# Patient Record
Sex: MALE | Race: WHITE | NOT HISPANIC OR LATINO | Employment: FULL TIME | URBAN - METROPOLITAN AREA
[De-identification: names, ages, dates, MRNs, and addresses within clinical notes are randomized per-mention and may not be internally consistent; named-entity substitution may affect disease eponyms.]

---

## 2019-10-22 ENCOUNTER — OFFICE VISIT CONVERTED (OUTPATIENT)
Dept: ORTHOPEDIC SURGERY | Facility: CLINIC | Age: 34
End: 2019-10-22
Attending: ORTHOPAEDIC SURGERY

## 2019-11-07 ENCOUNTER — OFFICE VISIT CONVERTED (OUTPATIENT)
Dept: ORTHOPEDIC SURGERY | Facility: CLINIC | Age: 34
End: 2019-11-07
Attending: ORTHOPAEDIC SURGERY

## 2020-12-15 ENCOUNTER — HOSPITAL ENCOUNTER (OUTPATIENT)
Dept: MRI IMAGING | Facility: HOSPITAL | Age: 35
Discharge: HOME | End: 2020-12-15
Attending: NURSE PRACTITIONER

## 2021-05-15 VITALS — WEIGHT: 198 LBS | BODY MASS INDEX: 31.08 KG/M2 | HEIGHT: 67 IN | HEART RATE: 79 BPM | OXYGEN SATURATION: 97 %

## 2021-05-15 VITALS — WEIGHT: 198 LBS | HEIGHT: 67 IN | HEART RATE: 87 BPM | BODY MASS INDEX: 31.08 KG/M2 | OXYGEN SATURATION: 97 %

## 2021-06-22 ENCOUNTER — APPOINTMENT (OUTPATIENT)
Dept: GENERAL RADIOLOGY | Facility: HOSPITAL | Age: 36
End: 2021-06-22

## 2021-06-22 ENCOUNTER — HOSPITAL ENCOUNTER (EMERGENCY)
Facility: HOSPITAL | Age: 36
Discharge: HOME | End: 2021-06-22
Attending: EMERGENCY MEDICINE | Admitting: EMERGENCY MEDICINE

## 2021-06-22 VITALS
SYSTOLIC BLOOD PRESSURE: 134 MMHG | HEART RATE: 94 BPM | OXYGEN SATURATION: 97 % | RESPIRATION RATE: 16 BRPM | HEIGHT: 67 IN | BODY MASS INDEX: 32.56 KG/M2 | TEMPERATURE: 98.5 F | DIASTOLIC BLOOD PRESSURE: 107 MMHG | WEIGHT: 207.45 LBS

## 2021-06-22 DIAGNOSIS — M19.90 ARTHRITIS: Primary | ICD-10-CM

## 2021-06-22 LAB
ALBUMIN SERPL-MCNC: 4.9 G/DL (ref 3.5–5.2)
ALBUMIN/GLOB SERPL: 1.9 G/DL
ALP SERPL-CCNC: 95 U/L (ref 39–117)
ALT SERPL W P-5'-P-CCNC: 39 U/L (ref 1–41)
ANION GAP SERPL CALCULATED.3IONS-SCNC: 11.8 MMOL/L (ref 5–15)
AST SERPL-CCNC: 29 U/L (ref 1–40)
BASOPHILS # BLD AUTO: 0.02 10*3/MM3 (ref 0–0.2)
BASOPHILS NFR BLD AUTO: 0.3 % (ref 0–1.5)
BILIRUB SERPL-MCNC: 0.6 MG/DL (ref 0–1.2)
BUN SERPL-MCNC: 11 MG/DL (ref 6–20)
BUN/CREAT SERPL: 8.5 (ref 7–25)
CALCIUM SPEC-SCNC: 9.3 MG/DL (ref 8.6–10.5)
CHLORIDE SERPL-SCNC: 104 MMOL/L (ref 98–107)
CO2 SERPL-SCNC: 23.2 MMOL/L (ref 22–29)
CREAT SERPL-MCNC: 1.29 MG/DL (ref 0.76–1.27)
DEPRECATED RDW RBC AUTO: 37.9 FL (ref 37–54)
EOSINOPHIL # BLD AUTO: 0.04 10*3/MM3 (ref 0–0.4)
EOSINOPHIL NFR BLD AUTO: 0.5 % (ref 0.3–6.2)
ERYTHROCYTE [DISTWIDTH] IN BLOOD BY AUTOMATED COUNT: 12.1 % (ref 12.3–15.4)
GFR SERPL CREATININE-BSD FRML MDRD: 62 ML/MIN/1.73
GLOBULIN UR ELPH-MCNC: 2.6 GM/DL
GLUCOSE SERPL-MCNC: 82 MG/DL (ref 65–99)
HCT VFR BLD AUTO: 44.9 % (ref 37.5–51)
HGB BLD-MCNC: 15.7 G/DL (ref 13–17.7)
IMM GRANULOCYTES # BLD AUTO: 0.02 10*3/MM3 (ref 0–0.05)
IMM GRANULOCYTES NFR BLD AUTO: 0.3 % (ref 0–0.5)
LYMPHOCYTES # BLD AUTO: 1.91 10*3/MM3 (ref 0.7–3.1)
LYMPHOCYTES NFR BLD AUTO: 25.6 % (ref 19.6–45.3)
MCH RBC QN AUTO: 30.4 PG (ref 26.6–33)
MCHC RBC AUTO-ENTMCNC: 35 G/DL (ref 31.5–35.7)
MCV RBC AUTO: 86.8 FL (ref 79–97)
MONOCYTES # BLD AUTO: 0.44 10*3/MM3 (ref 0.1–0.9)
MONOCYTES NFR BLD AUTO: 5.9 % (ref 5–12)
NEUTROPHILS NFR BLD AUTO: 5.04 10*3/MM3 (ref 1.7–7)
NEUTROPHILS NFR BLD AUTO: 67.4 % (ref 42.7–76)
NRBC BLD AUTO-RTO: 0 /100 WBC (ref 0–0.2)
PLATELET # BLD AUTO: 161 10*3/MM3 (ref 140–450)
PMV BLD AUTO: 10.3 FL (ref 6–12)
POTASSIUM SERPL-SCNC: 3.9 MMOL/L (ref 3.5–5.2)
PROT SERPL-MCNC: 7.5 G/DL (ref 6–8.5)
RBC # BLD AUTO: 5.17 10*6/MM3 (ref 4.14–5.8)
SODIUM SERPL-SCNC: 139 MMOL/L (ref 136–145)
WBC # BLD AUTO: 7.47 10*3/MM3 (ref 3.4–10.8)

## 2021-06-22 PROCEDURE — 25010000002 DEXAMETHASONE PER 1 MG: Performed by: NURSE PRACTITIONER

## 2021-06-22 PROCEDURE — 85025 COMPLETE CBC W/AUTO DIFF WBC: CPT | Performed by: EMERGENCY MEDICINE

## 2021-06-22 PROCEDURE — 36415 COLL VENOUS BLD VENIPUNCTURE: CPT | Performed by: EMERGENCY MEDICINE

## 2021-06-22 PROCEDURE — 73630 X-RAY EXAM OF FOOT: CPT

## 2021-06-22 PROCEDURE — 96372 THER/PROPH/DIAG INJ SC/IM: CPT

## 2021-06-22 PROCEDURE — 25010000002 KETOROLAC TROMETHAMINE PER 15 MG: Performed by: NURSE PRACTITIONER

## 2021-06-22 PROCEDURE — 99283 EMERGENCY DEPT VISIT LOW MDM: CPT

## 2021-06-22 PROCEDURE — 80053 COMPREHEN METABOLIC PANEL: CPT | Performed by: EMERGENCY MEDICINE

## 2021-06-22 RX ORDER — BUPROPION HYDROCHLORIDE 150 MG/1
150 TABLET ORAL DAILY
COMMUNITY

## 2021-06-22 RX ORDER — DEXAMETHASONE SODIUM PHOSPHATE 4 MG/ML
4 INJECTION, SOLUTION INTRA-ARTICULAR; INTRALESIONAL; INTRAMUSCULAR; INTRAVENOUS; SOFT TISSUE ONCE
Status: COMPLETED | OUTPATIENT
Start: 2021-06-22 | End: 2021-06-22

## 2021-06-22 RX ORDER — METHYLPHENIDATE HYDROCHLORIDE 18 MG/1
18 TABLET ORAL DAILY
COMMUNITY

## 2021-06-22 RX ORDER — CLONIDINE HYDROCHLORIDE 0.1 MG/1
0.1 TABLET ORAL NIGHTLY
COMMUNITY

## 2021-06-22 RX ORDER — KETOROLAC TROMETHAMINE 30 MG/ML
60 INJECTION, SOLUTION INTRAMUSCULAR; INTRAVENOUS ONCE
Status: COMPLETED | OUTPATIENT
Start: 2021-06-22 | End: 2021-06-22

## 2021-06-22 RX ADMIN — KETOROLAC TROMETHAMINE 60 MG: 60 INJECTION, SOLUTION INTRAMUSCULAR at 21:01

## 2021-06-22 RX ADMIN — DEXAMETHASONE SODIUM PHOSPHATE 4 MG: 4 INJECTION, SOLUTION INTRA-ARTICULAR; INTRALESIONAL; INTRAMUSCULAR; INTRAVENOUS; SOFT TISSUE at 21:01

## 2021-06-22 NOTE — ED PROVIDER NOTES
Subjective   Patient reports that he was on vacation for 20 days and while on vacation he started to have bilateral foot pain.  He reports that when he returned to work his pain worsened a couple days ago.  He is in the  and wears lace up boots.  He reports that he was swollen and red where the boots were earlier today.  He reports that he showed his boss who advised him to come to the emergency department in case he has cellulitis.      History provided by:  Patient      Review of Systems   Musculoskeletal: Positive for joint swelling (Pain bilateral great toes.).   Neurological: Negative for weakness and numbness.   All other systems reviewed and are negative.      Past Medical History:   Diagnosis Date   • Anxiety    • Pain aggravated by physical activity    • PTSD (post-traumatic stress disorder)        No Known Allergies    Past Surgical History:   Procedure Laterality Date   • APPENDECTOMY     • NOSE SURGERY     • TONSILLECTOMY     • VASECTOMY         History reviewed. No pertinent family history.    Social History     Socioeconomic History   • Marital status:      Spouse name: Not on file   • Number of children: Not on file   • Years of education: Not on file   • Highest education level: Not on file   Tobacco Use   • Smoking status: Never Smoker   • Smokeless tobacco: Never Used   Vaping Use   • Vaping Use: Never used   Substance and Sexual Activity   • Alcohol use: Not Currently   • Drug use: Never           Objective   Physical Exam  Vitals and nursing note reviewed.   Constitutional:       General: He is not in acute distress.     Appearance: Normal appearance. He is not ill-appearing.   HENT:      Head: Normocephalic and atraumatic.   Eyes:      Pupils: Pupils are equal, round, and reactive to light.   Cardiovascular:      Rate and Rhythm: Normal rate and regular rhythm.   Pulmonary:      Effort: Pulmonary effort is normal.      Breath sounds: Normal breath sounds.   Abdominal:       General: Abdomen is flat. Bowel sounds are normal.      Palpations: Abdomen is soft.   Musculoskeletal:         General: Tenderness (Bilateral great toes) present. No swelling, deformity or signs of injury. Normal range of motion.      Cervical back: Normal range of motion and neck supple.      Right lower leg: No edema.      Left lower leg: No edema.   Skin:     General: Skin is warm and dry.   Neurological:      General: No focal deficit present.      Mental Status: He is alert and oriented to person, place, and time.   Psychiatric:         Mood and Affect: Mood normal.         Behavior: Behavior normal.         Thought Content: Thought content normal.         Procedures           ED Course                                           MDM  Number of Diagnoses or Management Options     Amount and/or Complexity of Data Reviewed  Clinical lab tests: reviewed  Tests in the radiology section of CPT®: reviewed    Patient Progress  Patient progress: stable      Final diagnoses:   Arthritis       ED Disposition  ED Disposition     ED Disposition Condition Comment    Discharge Stable           Smitha Mulligan  [address] [place] [address]  [phone]      As needed         Medication List      No changes were made to your prescriptions during this visit.          Anahy Perkins, APRN  06/23/21 0145

## 2021-06-22 NOTE — ED TRIAGE NOTES
Pt arrived via PV from home with c/o bilateral feet swelling, redness and pain that started a couple days ago and stays constant. Pt denies any fevers.

## 2021-06-23 NOTE — DISCHARGE INSTRUCTIONS
As discussed, your symptoms are likely related to development of folliculitis.  Please take over-the-counter ibuprofen to decrease inflammation and follow-up with your primary care provider if symptoms persist.

## 2022-05-31 ENCOUNTER — OFFICE VISIT (OUTPATIENT)
Dept: NEUROSURGERY | Facility: CLINIC | Age: 37
End: 2022-05-31

## 2022-05-31 VITALS
HEIGHT: 67 IN | DIASTOLIC BLOOD PRESSURE: 107 MMHG | SYSTOLIC BLOOD PRESSURE: 136 MMHG | BODY MASS INDEX: 31.42 KG/M2 | WEIGHT: 200.2 LBS

## 2022-05-31 DIAGNOSIS — M47.812 CERVICAL SPONDYLOSIS WITHOUT MYELOPATHY: Primary | ICD-10-CM

## 2022-05-31 PROCEDURE — 99203 OFFICE O/P NEW LOW 30 MIN: CPT | Performed by: NEUROLOGICAL SURGERY

## 2022-05-31 NOTE — PROGRESS NOTES
"Chief Complaint  Neck Pain    Subjective          Jordy Mg who is a 37 y.o. year old male who presents to [facility] for Evaluation of the Spine.     The patient complains of pain located in the cervical spine.  Patients states the pain has been present for 10 years.  The pain came on gradually.  The pain scale level is 5-8/10 in severity.  The pain can radiate into the bilateral arms, but the neck pain is worse than the arm pain.  The pain is waxing/waning and described as dull and pressure like.  The pain is worse in the evening. Patient states nothing worsens the pain. Patient states ice  and massage/dry needling makes the pain better.    Associated Symptoms Include: Numbness and Tingling into the small fingers mostly at night  Conservative Interventions Include: Gabapentin-feels a little off, flexeril-no help, but makes sleepy, he has had some injections and ablations with PT with temporary relief    Was this the result of an injury or accident?: No, but significant wear and tear as  medic    History of Previous Spinal Surgery?: No    This patient  reports that he has never smoked. He has never used smokeless tobacco.    Review of Systems   Musculoskeletal: Positive for back pain, myalgias and neck pain.   Neurological: Positive for numbness and headache.        Objective   Vital Signs:   BP (!) 136/107 (BP Location: Right arm, Patient Position: Sitting)   Ht 170.2 cm (67\")   Wt 90.8 kg (200 lb 3.2 oz)   BMI 31.36 kg/m²       Physical Exam  Constitutional:       Appearance: He is normal weight.   Neck:      Comments: Increased pain turning left and with extension  Musculoskeletal:         General: Tenderness (mild TTP in the cervical region) present.      Cervical back: Normal range of motion.   Neurological:      Mental Status: He is alert.      Sensory: No sensory deficit.      Motor: No weakness.      Deep Tendon Reflexes: Reflexes normal (neg " Pito).   Psychiatric:         Mood and Affect: Mood normal.          Result Review :   I personally reviewed the patient's MRI scan which shows C3-4, C4-5, C5-6 and C6-7 with no significant spinal stenosis.      Assessment and Plan    Diagnoses and all orders for this visit:    1. Cervical spondylosis without myelopathy (Primary)    He will attempt to avoid surgery for as long as possible. He will monitor for worsened arm pain. His current intermittent arm numbness (small finger) could be ulnar related, but not likely from the neck.    He will work on cervical strengthening and stay away from strenuous activity.     Follow Up   No follow-ups on file.  Patient was given instructions and counseling regarding his condition or for health maintenance advice. Please see specific information pulled into the AVS if appropriate.

## 2023-07-21 ENCOUNTER — APPOINTMENT (OUTPATIENT)
Dept: GENERAL RADIOLOGY | Facility: HOSPITAL | Age: 38
End: 2023-07-21
Payer: OTHER GOVERNMENT

## 2023-07-21 ENCOUNTER — APPOINTMENT (OUTPATIENT)
Dept: MRI IMAGING | Facility: HOSPITAL | Age: 38
End: 2023-07-21
Payer: OTHER GOVERNMENT

## 2023-07-21 ENCOUNTER — HOSPITAL ENCOUNTER (EMERGENCY)
Facility: HOSPITAL | Age: 38
Discharge: HOME | End: 2023-07-21
Attending: EMERGENCY MEDICINE | Admitting: EMERGENCY MEDICINE
Payer: OTHER GOVERNMENT

## 2023-07-21 VITALS
RESPIRATION RATE: 22 BRPM | OXYGEN SATURATION: 100 % | HEART RATE: 114 BPM | SYSTOLIC BLOOD PRESSURE: 119 MMHG | DIASTOLIC BLOOD PRESSURE: 98 MMHG | TEMPERATURE: 98.7 F | BODY MASS INDEX: 29.58 KG/M2 | HEIGHT: 67 IN | WEIGHT: 188.49 LBS

## 2023-07-21 DIAGNOSIS — M51.36 ANNULAR TEAR OF LUMBAR DISC: ICD-10-CM

## 2023-07-21 DIAGNOSIS — S39.012A STRAIN OF LUMBAR REGION, INITIAL ENCOUNTER: Primary | ICD-10-CM

## 2023-07-21 PROCEDURE — 72148 MRI LUMBAR SPINE W/O DYE: CPT

## 2023-07-21 PROCEDURE — 72100 X-RAY EXAM L-S SPINE 2/3 VWS: CPT

## 2023-07-21 PROCEDURE — 96372 THER/PROPH/DIAG INJ SC/IM: CPT

## 2023-07-21 PROCEDURE — 63710000001 ONDANSETRON ODT 4 MG TABLET DISPERSIBLE

## 2023-07-21 PROCEDURE — 25010000002 KETOROLAC TROMETHAMINE PER 15 MG

## 2023-07-21 PROCEDURE — 99283 EMERGENCY DEPT VISIT LOW MDM: CPT

## 2023-07-21 RX ORDER — CYCLOBENZAPRINE HCL 10 MG
10 TABLET ORAL 3 TIMES DAILY PRN
Qty: 30 TABLET | Refills: 0 | Status: SHIPPED | OUTPATIENT
Start: 2023-07-21 | End: 2023-07-21 | Stop reason: SDUPTHER

## 2023-07-21 RX ORDER — KETOROLAC TROMETHAMINE 10 MG/1
10 TABLET, FILM COATED ORAL EVERY 6 HOURS PRN
Qty: 20 TABLET | Refills: 0 | Status: SHIPPED | OUTPATIENT
Start: 2023-07-21 | End: 2023-07-21 | Stop reason: SDUPTHER

## 2023-07-21 RX ORDER — ONDANSETRON 4 MG/1
4 TABLET, ORALLY DISINTEGRATING ORAL ONCE
Status: COMPLETED | OUTPATIENT
Start: 2023-07-21 | End: 2023-07-21

## 2023-07-21 RX ORDER — CYCLOBENZAPRINE HCL 10 MG
10 TABLET ORAL 3 TIMES DAILY PRN
Qty: 30 TABLET | Refills: 0 | Status: SHIPPED | OUTPATIENT
Start: 2023-07-21 | End: 2023-07-31

## 2023-07-21 RX ORDER — TRAMADOL HYDROCHLORIDE 50 MG/1
50 TABLET ORAL EVERY 6 HOURS PRN
Qty: 16 TABLET | Refills: 0 | Status: SHIPPED | OUTPATIENT
Start: 2023-07-21 | End: 2023-07-25

## 2023-07-21 RX ORDER — KETOROLAC TROMETHAMINE 30 MG/ML
30 INJECTION, SOLUTION INTRAMUSCULAR; INTRAVENOUS ONCE
Status: COMPLETED | OUTPATIENT
Start: 2023-07-21 | End: 2023-07-21

## 2023-07-21 RX ORDER — TRAMADOL HYDROCHLORIDE 50 MG/1
50 TABLET ORAL EVERY 6 HOURS PRN
Qty: 16 TABLET | Refills: 0 | Status: SHIPPED | OUTPATIENT
Start: 2023-07-21 | End: 2023-07-21 | Stop reason: SDUPTHER

## 2023-07-21 RX ORDER — KETOROLAC TROMETHAMINE 10 MG/1
10 TABLET, FILM COATED ORAL EVERY 6 HOURS PRN
Qty: 20 TABLET | Refills: 0 | Status: SHIPPED | OUTPATIENT
Start: 2023-07-21 | End: 2023-07-26

## 2023-07-21 RX ADMIN — ONDANSETRON 4 MG: 4 TABLET, ORALLY DISINTEGRATING ORAL at 15:40

## 2023-07-21 RX ADMIN — KETOROLAC TROMETHAMINE 30 MG: 30 INJECTION, SOLUTION INTRAMUSCULAR; INTRAVENOUS at 15:40

## 2023-07-21 NOTE — ED PROVIDER NOTES
Time: 3:12 PM EDT  Date of encounter:  7/21/2023  Independent Historian/Clinical History and Information was obtained by:   Patient    History is limited by: N/A    Chief Complaint: Back pain      History of Present Illness:  Patient is a 38 y.o. year old male who presents to the emergency department for evaluation of sudden onset back pain while he was pulling a tree out of his yard.  Patient states he felt something pop in his lower back and excruciating pain.  Patient denies any numbness and tingling denies loss of bladder or bowel function.    HPI    Patient Care Team  Primary Care Provider: Smitha Mulligan APRN    Past Medical History:     No Known Allergies  Past Medical History:   Diagnosis Date   • ADHD    • Anxiety    • Arthritis    • Brain concussion    • Cervical disc disorder    • Headache    • Hyperlipidemia    • Hypertension    • Low back pain    • Lumbosacral disc disease    • Migraines    • Pain aggravated by physical activity    • Peripheral neuropathy    • Plantar fasciitis    • PTSD (post-traumatic stress disorder)      Past Surgical History:   Procedure Laterality Date   • APPENDECTOMY     • EPIDURAL BLOCK      Neck   • NOSE SURGERY     • TONSILLECTOMY     • TRIGGER POINT INJECTION      Neck and shoulders   • VASECTOMY       Family History   Family history unknown: Yes       Home Medications:  Prior to Admission medications    Medication Sig Start Date End Date Taking? Authorizing Provider   buPROPion (WELLBUTRIN) 75 MG tablet bupropion HCl 75 mg tablet    Emergency, Nurse KENNETH Obrien   buPROPion XL (WELLBUTRIN XL) 150 MG 24 hr tablet Take 150 mg by mouth Daily.    ProviderFamilia MD   cloNIDine (CATAPRES) 0.1 MG tablet Take 0.1 mg by mouth Every Night.    ProviderFamilia MD   cyclobenzaprine (FLEXERIL) 10 MG tablet cyclobenzaprine 10 mg tablet    Emergency, Nurse Micah RN   Diclofenac Sodium (VOLTAREN) 1 % gel gel Apply 4 g topically to the appropriate area as directed 3 (Three) Times a Day  "As Needed.    Provider, MD Familia   escitalopram (LEXAPRO) 10 MG tablet escitalopram 10 mg tablet    Emergency, Nurse Micah RN   escitalopram (LEXAPRO) 20 MG tablet escitalopram 20 mg tablet    Emergency, Nurse Micah RN   gabapentin (NEURONTIN) 300 MG capsule gabapentin 300 mg capsule    Emergency, Nurse Micah, RN   methylphenidate 18 MG CR tablet Take 18 mg by mouth Daily    Provider, MD Familia   Methylphenidate HCl ER 54 MG tablet sustained-release 24 hour methylphenidate ER 54 mg tablet,extended release 24 hr    Emergency, Nurse Micah RN   propranolol (INDERAL) 10 MG tablet 40 mg Daily.    Emergency, Nurse Micah, RN   Testosterone 10 MG/ACT (2%) gel testosterone 10 mg/0.5 gram/actuation transdermal gel pump    Emergency, Nurse Micah RN        Social History:   Social History     Tobacco Use   • Smoking status: Never   • Smokeless tobacco: Never   Vaping Use   • Vaping Use: Never used   Substance Use Topics   • Alcohol use: Not Currently   • Drug use: Never         Review of Systems:  Review of Systems   Constitutional:  Negative for chills and fever.   Gastrointestinal:  Negative for abdominal pain, nausea and vomiting.        Negative for bowel incontinence   Genitourinary:  Negative for dysuria, flank pain and hematuria.        Negative for bladder incontinence   Musculoskeletal:  Positive for back pain.   Neurological:  Negative for weakness and numbness.        Negative for saddle anesthesia   All other systems reviewed and are negative.     Physical Exam:  /98   Pulse 114   Temp 98.7 °F (37.1 °C) (Oral)   Resp 22   Ht 170.2 cm (67\")   Wt 85.5 kg (188 lb 7.9 oz)   SpO2 100%   BMI 29.52 kg/m²     Physical Exam  Vitals and nursing note reviewed.   Constitutional:       General: He is not in acute distress.     Appearance: Normal appearance. He is not toxic-appearing.   HENT:      Head: Normocephalic and atraumatic.      Jaw: There is normal jaw occlusion.   Eyes:      General: Lids are " normal.      Extraocular Movements: Extraocular movements intact.      Conjunctiva/sclera: Conjunctivae normal.      Pupils: Pupils are equal, round, and reactive to light.   Cardiovascular:      Rate and Rhythm: Normal rate and regular rhythm.      Pulses: Normal pulses.      Heart sounds: Normal heart sounds.   Pulmonary:      Effort: Pulmonary effort is normal. No respiratory distress.      Breath sounds: Normal breath sounds. No wheezing or rhonchi.   Abdominal:      General: Abdomen is flat.      Palpations: Abdomen is soft.      Tenderness: There is no abdominal tenderness. There is no guarding or rebound.   Musculoskeletal:         General: Swelling present. Normal range of motion.      Cervical back: Normal range of motion and neck supple.      Lumbar back: Swelling, tenderness and bony tenderness present.      Right lower leg: No edema.      Left lower leg: No edema.   Skin:     General: Skin is warm and dry.   Neurological:      Mental Status: He is alert and oriented to person, place, and time. Mental status is at baseline.   Psychiatric:         Mood and Affect: Mood normal.                Procedures:  Procedures      Medical Decision Making:      Comorbidities that affect care:    None    External Notes reviewed:    Previous Clinic Note: Office visit with [facility] from 3/6/2023      The following orders were placed and all results were independently analyzed by me:  Orders Placed This Encounter   Procedures   • XR Spine Lumbar 2 or 3 View   • MRI Lumbar Spine Without Contrast   • General MD Inpatient Consult       Medications Given in the Emergency Department:  Medications   ondansetron ODT (ZOFRAN-ODT) disintegrating tablet 4 mg (4 mg Oral Given 7/21/23 1540)   ketorolac (TORADOL) injection 30 mg (30 mg Intramuscular Given 7/21/23 1540)        ED Course:    ED Course as of 07/21/23 1823   Fri Jul 21, 2023   1620 Went to discuss discharge with the patient when he reported feeling  numbness and tingling in his toes and lower legs.  Plan to order MRI before discharge. [RM]   1812 Spoke with Dr. Francisco with neurosurgery at [facility] regarding patient's MRI results, his recommendations are for the patient to follow-up with primary care for physical therapy referral, states the patient's pain should resolve in a couple of weeks, but if it continues after therapy he should follow-up with neurosurgery. [RM]      ED Course User Index  [RM] Yahaira Sorensen APRN       Labs:    Lab Results (last 24 hours)       ** No results found for the last 24 hours. **             Imaging:    XR Spine Lumbar 2 or 3 View    Result Date: 7/21/2023  PROCEDURE: XR SPINE LUMBAR 2 OR 3 VW  COMPARISON: None  INDICATIONS: GENERALIZED LOWER BACK PAIN AFTER INJURY TODAY  FINDINGS:  No fracture is identified.  Alignment and mineralization appear within normal limits.  There is mild marginal disc osteophyte formation at L3-4.  Disc heights appear preserved.  No significant facet arthropathy.  Soft tissues appear unremarkable.        1. No radiographic findings of acute osseous lumbar spine abnormality.  2. MRI may be considered for further evaluation as clinically indicated.     ROMY MOSER MD       Electronically Signed and Approved By: ROMY MOSER MD on 7/21/2023 at 15:43             MRI Lumbar Spine Without Contrast    Result Date: 7/21/2023  PROCEDURE: MRI LUMBAR SPINE WO CONTRAST  COMPARISON: [facility], , XR SPINE LUMBAR 2 OR 3 VW, 7/21/2023, 15:31.  INDICATIONS: Low back pain, spondyloarthropathy suspected, xray done  TECHNIQUE: Multiplanar multisequence images of the lumbar spine without contrast.  FINDINGS:  No evidence of acute marrow edema or fracture.  No paraspinal masses are identified.  The abdominal aorta a normal caliber.  The conus medullaris has a normal appearance ending at the L1-L2 level.  L1-L2:  No evidence of significant focal disc protrusion, central canal or foraminal stenosis..   L2-L3:  No evidence of significant focal disc protrusion, central canal or foraminal stenosis.  L3-L4:  No evidence of significant focal disc protrusion, central canal or foraminal stenosis.  L4-L5:  No evidence of significant focal disc protrusion, central canal or foraminal stenosis.  L5-S1:  Mild disc desiccation is present.  There is a posterior right annular tear.  There is a 3 mm broad-based right paracentral disc protrusion.  No evidence of significant central canal or foraminal stenosis.  IMPRESSION:  Disc degeneration at L5-S1.  Posterior right annular tear at L5-S1 with a 3 mm broad-based right paracentral disc protrusion.  No evidence of significant central canal or foraminal stenosis.   TOMY KARIMI MD       Electronically Signed and Approved By: TOMY KARIMI MD on 7/21/2023 at 17:12                Differential Diagnosis and Discussion:    Back Pain: The patient presents with back pain. My differential diagnosis includes but is not limited to acute spinal epidural abscess, acute spinal epidural bleed, cauda equina syndrome, abdominal aortic aneurysm, aortic dissection, kidney stone, pyelonephritis, musculoskeletal back pain, spinal fracture, and osteoarthritis.     MRI impression was interpreted by me.     MDM     Amount and/or Complexity of Data Reviewed  Tests in the radiology section of CPT®: reviewed    The patient´s symptoms are consistent with musculoskeletal back pain. The patient is now resting comfortably, feels better, is alert, talkative, interactive and in no distress. The repeat examination is unremarkable and benign. The patient is neurologically intact and is ambulatory in the ED. The patient has no fever, no bowel or bladder incontinence, no saddle anesthesia, and is otherwise alert and well appearing. The history, physical exam, and diagnostics (if any) do not suggest the presence of acute spinal epidural abscess, acute spinal epidural bleed, cauda equina syndrome, abdominal  aortic aneurysm, aortic dissection or other process requiring further testing, treatment or consultation in the emergency department. The vital signs have been stable. The patient's condition is stable and appropriate for discharge. The patient will pursue further outpatient evaluation with the primary care physician or other designated for consulting position as indicated in the discharge instructions.        Patient Care Considerations:    NARCOTICS: I considered prescribing opiate pain medication as an outpatient, however patient states he drove to ED and would have no at home.  Patient states he would rather not take narcotics at this time.      Consultants/Shared Management Plan:    Consultant: I have discussed the case with Dr. Francisco who states patient should follow-up with his primary care physician for physical therapy referral, but states if the pain does not resolve in a couple of weeks after therapy patient follow-up with neurosurgery.    Social Determinants of Health:    Patient is independent, reliable, and has access to care.       Disposition and Care Coordination:    Discharged: I considered escalation of care by admitting this patient for observation, however the patient has improved and is suitable and  stable for discharge.    I have explained the patient´s condition, diagnoses and treatment plan based on the information available to me at this time. I have answered questions and addressed any concerns. The patient has a good  understanding of the patient´s diagnosis, condition, and treatment plan as can be expected at this point. The vital signs have been stable. The patient´s condition is stable and appropriate for discharge from the emergency department.      The patient will pursue further outpatient evaluation with the primary care physician or other designated or consulting physician as outlined in the discharge instructions. They are agreeable to this plan of care and follow-up instructions  have been explained in detail. The patient has received these instructions in written format and have expressed an understanding of the discharge instructions. The patient is aware that any significant change in condition or worsening of symptoms should prompt an immediate return to this or the closest emergency department or call to [facility].  I have explained discharge medications and the need for follow up with the patient/caretakers. This was also printed in the discharge instructions. Patient was discharged with the following medications and follow up:      Medication List        New Prescriptions      cyclobenzaprine 10 MG tablet  Commonly known as: FLEXERIL  Take 1 tablet by mouth 3 (Three) Times a Day As Needed for Muscle Spasms for up to 10 days.     ketorolac 10 MG tablet  Commonly known as: TORADOL  Take 1 tablet by mouth Every 6 (Six) Hours As Needed for Moderate Pain for up to 5 days.               Where to Get Your Medications        These medications were sent to [facility] - [address] [phone] [place] [phone] [place] [address]      Phone: [phone]   cyclobenzaprine 10 MG tablet  ketorolac 10 MG tablet      Smitha Mulligan APRN  [address] [place] [address]  [phone]    In 2 days        Final diagnoses:   Strain of lumbar region, initial encounter        ED Disposition       ED Disposition   Discharge    Condition   Stable    Comment   --               This medical record created using voice recognition software.             Yahaira Sorensen APRN  07/21/23 1826       Yahaira Sorensen APRN  07/21/23 0009

## 2023-11-25 ENCOUNTER — TELEMEDICINE (OUTPATIENT)
Dept: FAMILY MEDICINE CLINIC | Facility: TELEHEALTH | Age: 38
End: 2023-11-25
Payer: OTHER GOVERNMENT

## 2023-11-25 VITALS — TEMPERATURE: 97.9 F

## 2023-11-25 DIAGNOSIS — K52.9 GASTROENTERITIS: Primary | ICD-10-CM

## 2023-11-25 RX ORDER — CLONAZEPAM 1 MG/1
TABLET ORAL
COMMUNITY
Start: 2023-08-17

## 2023-11-25 RX ORDER — QUETIAPINE FUMARATE 100 MG/1
TABLET, FILM COATED ORAL
COMMUNITY
Start: 2023-09-18

## 2023-11-25 RX ORDER — ONDANSETRON 8 MG/1
8 TABLET, ORALLY DISINTEGRATING ORAL EVERY 8 HOURS PRN
Qty: 21 TABLET | Refills: 0 | Status: SHIPPED | OUTPATIENT
Start: 2023-11-25

## 2023-11-25 RX ORDER — TESTOSTERONE 20.25 MG/1.25G
GEL TOPICAL
COMMUNITY
Start: 2023-11-03

## 2023-11-25 RX ORDER — SILDENAFIL 100 MG/1
TABLET, FILM COATED ORAL
COMMUNITY
Start: 2023-11-03

## 2023-11-25 RX ORDER — LOPERAMIDE HYDROCHLORIDE 2 MG/1
2 TABLET ORAL 4 TIMES DAILY PRN
Qty: 20 TABLET | Refills: 0 | Status: SHIPPED | OUTPATIENT
Start: 2023-11-25

## 2023-11-25 RX ORDER — OMEPRAZOLE 40 MG/1
CAPSULE, DELAYED RELEASE ORAL
COMMUNITY

## 2023-11-25 RX ORDER — CARVEDILOL 6.25 MG/1
TABLET ORAL
COMMUNITY

## 2023-11-25 RX ORDER — PROPRANOLOL HYDROCHLORIDE 40 MG/1
TABLET ORAL
COMMUNITY

## 2023-11-25 NOTE — PROGRESS NOTES
You have chosen to receive care through a telehealth visit.  Do you consent to use a video/audio connection for your medical care today? Yes     CHIEF COMPLAINT  No chief complaint on file.        HPI  Jordy Mg is a 38 y.o. male  presents with complaint of woke up with sharp abdominal pain this morning, fever 103, nausea, vomiting, diarrhea.   Denies cough, shortness of breath, congestion, wheezing, sore throat. Abdominal pain has eased up some at this time    Review of Systems  See HPI    Past Medical History:   Diagnosis Date    ADHD     Anxiety     Arthritis     Brain concussion     Cervical disc disorder     Headache     Hyperlipidemia     Hypertension     Low back pain     Lumbosacral disc disease     Migraines     Pain aggravated by physical activity     Peripheral neuropathy     Plantar fasciitis     PTSD (post-traumatic stress disorder)        Family History   Family history unknown: Yes       Social History     Socioeconomic History    Marital status:    Tobacco Use    Smoking status: Never    Smokeless tobacco: Never   Vaping Use    Vaping Use: Never used   Substance and Sexual Activity    Alcohol use: Not Currently    Drug use: Never    Sexual activity: Yes     Partners: Female     Birth control/protection: None       Jordy Mg  reports that he has never smoked. He has never used smokeless tobacco..               Temp 97.9 °F (36.6 °C)     PHYSICAL EXAM  Physical Exam   Constitutional: He is oriented to person, place, and time. He appears well-developed and well-nourished. He does not have a sickly appearance. He appears ill.   HENT:   Head: Normocephalic and atraumatic.   Pulmonary/Chest: Effort normal.  No respiratory distress.  Neurological: He is alert and oriented to person, place, and time.       Results for orders placed or performed during the hospital encounter of 11/25/23   MOSES FLU + SARS PCR    Specimen: Nasal Cavity; Swab   Result Value Ref Range    COVID19 Not  Detected Not Detected - Ref. Range    POC Influenza A, Molecular Not Detected Negative / Not Detected    POC Influenza B, Molecular Not Detected Negative / Not Detected    Internal Control Passed Passed    Lot Number [id number]     Expiration Date 1/31/25        Diagnoses and all orders for this visit:    1. Gastroenteritis (Primary)  -     MOSES FLU + SARS PCR; Future  -     ondansetron ODT (ZOFRAN-ODT) 8 MG disintegrating tablet; Place 1 tablet on the tongue Every 8 (Eight) Hours As Needed for Nausea or Vomiting.  Dispense: 21 tablet; Refill: 0  -     loperamide (Imodium A-D) 2 MG tablet; Take 1 tablet by mouth 4 (Four) Times a Day As Needed for Diarrhea.  Dispense: 20 tablet; Refill: 0    --negative COVID and Flu A/B--pt notified  --take medications as prescribed  --increase fluids, rest as needed, tylenol or ibuprofen for pain  --f/u in 2-3 days if no improvement; go to ER for evaluation if abdominal pain does not improve or worsens.        FOLLOW-UP  As discussed during visit with PCP/[facility] if no improvement or Urgent Care/Emergency Department if worsening of symptoms    Patient verbalizes understanding of medication dosage, comfort measures, instructions for treatment and follow-up.    aMrgarette Gonzalez, APRN  11/25/2023  16:12 EST    The use of a video visit has been reviewed with the patient and verbal informed consent has been obtained. Myself and Jordy Mg participated in this visit. The patient is located in [address] [place] [address].    I am located in [place], [place]. [email or web address] and [employer] were utilized. I spent 8 minutes in the patient's chart for this visit.

## 2024-05-14 ENCOUNTER — APPOINTMENT (OUTPATIENT)
Dept: CT IMAGING | Facility: HOSPITAL | Age: 39
End: 2024-05-14
Payer: OTHER GOVERNMENT

## 2024-05-14 ENCOUNTER — HOSPITAL ENCOUNTER (EMERGENCY)
Facility: HOSPITAL | Age: 39
Discharge: HOME | End: 2024-05-14
Attending: EMERGENCY MEDICINE | Admitting: EMERGENCY MEDICINE
Payer: OTHER GOVERNMENT

## 2024-05-14 VITALS
OXYGEN SATURATION: 100 % | DIASTOLIC BLOOD PRESSURE: 102 MMHG | WEIGHT: 202.6 LBS | TEMPERATURE: 98.3 F | RESPIRATION RATE: 16 BRPM | HEART RATE: 79 BPM | HEIGHT: 67 IN | SYSTOLIC BLOOD PRESSURE: 138 MMHG | BODY MASS INDEX: 31.8 KG/M2

## 2024-05-14 DIAGNOSIS — R10.9 LEFT FLANK PAIN: Primary | ICD-10-CM

## 2024-05-14 LAB
ALBUMIN SERPL-MCNC: 4.4 G/DL (ref 3.5–5.2)
ALBUMIN/GLOB SERPL: 1.8 G/DL
ALP SERPL-CCNC: 83 U/L (ref 39–117)
ALT SERPL W P-5'-P-CCNC: 40 U/L (ref 1–41)
ANION GAP SERPL CALCULATED.3IONS-SCNC: 10.3 MMOL/L (ref 5–15)
AST SERPL-CCNC: 26 U/L (ref 1–40)
BASOPHILS # BLD AUTO: 0.02 10*3/MM3 (ref 0–0.2)
BASOPHILS NFR BLD AUTO: 0.4 % (ref 0–1.5)
BILIRUB SERPL-MCNC: 0.4 MG/DL (ref 0–1.2)
BILIRUB UR QL STRIP: NEGATIVE
BUN SERPL-MCNC: 12 MG/DL (ref 6–20)
BUN/CREAT SERPL: 10.2 (ref 7–25)
CALCIUM SPEC-SCNC: 9.1 MG/DL (ref 8.6–10.5)
CHLORIDE SERPL-SCNC: 106 MMOL/L (ref 98–107)
CLARITY UR: CLEAR
CO2 SERPL-SCNC: 25.7 MMOL/L (ref 22–29)
COLOR UR: YELLOW
CREAT SERPL-MCNC: 1.18 MG/DL (ref 0.76–1.27)
DEPRECATED RDW RBC AUTO: 41 FL (ref 37–54)
EGFRCR SERPLBLD CKD-EPI 2021: 79.5 ML/MIN/1.73
EOSINOPHIL # BLD AUTO: 0.04 10*3/MM3 (ref 0–0.4)
EOSINOPHIL NFR BLD AUTO: 0.7 % (ref 0.3–6.2)
ERYTHROCYTE [DISTWIDTH] IN BLOOD BY AUTOMATED COUNT: 12.7 % (ref 12.3–15.4)
GLOBULIN UR ELPH-MCNC: 2.5 GM/DL
GLUCOSE SERPL-MCNC: 88 MG/DL (ref 65–99)
GLUCOSE UR STRIP-MCNC: NEGATIVE MG/DL
HCT VFR BLD AUTO: 45 % (ref 37.5–51)
HGB BLD-MCNC: 15.4 G/DL (ref 13–17.7)
HGB UR QL STRIP.AUTO: NEGATIVE
HOLD SPECIMEN: NORMAL
HOLD SPECIMEN: NORMAL
IMM GRANULOCYTES # BLD AUTO: 0.01 10*3/MM3 (ref 0–0.05)
IMM GRANULOCYTES NFR BLD AUTO: 0.2 % (ref 0–0.5)
KETONES UR QL STRIP: NEGATIVE
LEUKOCYTE ESTERASE UR QL STRIP.AUTO: NEGATIVE
LIPASE SERPL-CCNC: 34 U/L (ref 13–60)
LYMPHOCYTES # BLD AUTO: 1.93 10*3/MM3 (ref 0.7–3.1)
LYMPHOCYTES NFR BLD AUTO: 35 % (ref 19.6–45.3)
MCH RBC QN AUTO: 30.4 PG (ref 26.6–33)
MCHC RBC AUTO-ENTMCNC: 34.2 G/DL (ref 31.5–35.7)
MCV RBC AUTO: 88.9 FL (ref 79–97)
MONOCYTES # BLD AUTO: 0.44 10*3/MM3 (ref 0.1–0.9)
MONOCYTES NFR BLD AUTO: 8 % (ref 5–12)
NEUTROPHILS NFR BLD AUTO: 3.07 10*3/MM3 (ref 1.7–7)
NEUTROPHILS NFR BLD AUTO: 55.7 % (ref 42.7–76)
NITRITE UR QL STRIP: NEGATIVE
NRBC BLD AUTO-RTO: 0 /100 WBC (ref 0–0.2)
PH UR STRIP.AUTO: 6 [PH] (ref 5–8)
PLATELET # BLD AUTO: 151 10*3/MM3 (ref 140–450)
PMV BLD AUTO: 10.4 FL (ref 6–12)
POTASSIUM SERPL-SCNC: 4 MMOL/L (ref 3.5–5.2)
PROT SERPL-MCNC: 6.9 G/DL (ref 6–8.5)
PROT UR QL STRIP: NEGATIVE
RBC # BLD AUTO: 5.06 10*6/MM3 (ref 4.14–5.8)
SODIUM SERPL-SCNC: 142 MMOL/L (ref 136–145)
SP GR UR STRIP: 1.02 (ref 1–1.03)
UROBILINOGEN UR QL STRIP: NORMAL
WBC NRBC COR # BLD AUTO: 5.51 10*3/MM3 (ref 3.4–10.8)
WHOLE BLOOD HOLD COAG: NORMAL
WHOLE BLOOD HOLD SPECIMEN: NORMAL

## 2024-05-14 PROCEDURE — 85025 COMPLETE CBC W/AUTO DIFF WBC: CPT | Performed by: EMERGENCY MEDICINE

## 2024-05-14 PROCEDURE — 83690 ASSAY OF LIPASE: CPT | Performed by: EMERGENCY MEDICINE

## 2024-05-14 PROCEDURE — 81003 URINALYSIS AUTO W/O SCOPE: CPT | Performed by: EMERGENCY MEDICINE

## 2024-05-14 PROCEDURE — 36415 COLL VENOUS BLD VENIPUNCTURE: CPT

## 2024-05-14 PROCEDURE — 80053 COMPREHEN METABOLIC PANEL: CPT | Performed by: EMERGENCY MEDICINE

## 2024-05-14 PROCEDURE — 74177 CT ABD & PELVIS W/CONTRAST: CPT

## 2024-05-14 PROCEDURE — 99285 EMERGENCY DEPT VISIT HI MDM: CPT

## 2024-05-14 PROCEDURE — 25510000001 IOPAMIDOL PER 1 ML: Performed by: EMERGENCY MEDICINE

## 2024-05-14 RX ORDER — SODIUM CHLORIDE 0.9 % (FLUSH) 0.9 %
10 SYRINGE (ML) INJECTION AS NEEDED
Status: DISCONTINUED | OUTPATIENT
Start: 2024-05-14 | End: 2024-05-14 | Stop reason: HOSPADM

## 2024-05-14 RX ADMIN — IOPAMIDOL 90 ML: 755 INJECTION, SOLUTION INTRAVENOUS at 17:12

## 2024-05-14 NOTE — DISCHARGE INSTRUCTIONS
Continue ibuprofen at home as needed for pain control.  Follow-up with your primary care provider if symptoms do not improve within 4 to 5 days.  Return for worsening pain, intractable fever, intractable vomiting, syncope, chest pain, shortness of breath, or other symptoms concerning to you.

## 2024-05-14 NOTE — ED PROVIDER NOTES
Time: 4:20 PM EDT  Date of encounter:  5/14/2024  Independent Historian/Clinical History and Information was obtained by:   Patient    History is limited by: N/A    Chief Complaint: Left flank pain      History of Present Illness:  Patient is a 39 y.o. year old male who presents to the emergency department for evaluation of left flank pain.  Patient states he has been having intermittent left flank pain for the last week.  He does state he also has some low back pain but this is chronic for him and has not changed.  Patient denies any injury to the area.  Patient states that he has tried using massage event, Tylenol, and ibuprofen at home without any relief of symptoms.  Patient is denying any abdominal pain, nausea, vomiting, diarrhea, dysuria, fever, chest pain, shortness of breath.  Patient states that a couple of days ago he did have 1 episode of hematuria and he believes he passed the kidney stone at that time.  Patient states he has not had any more hematuria since then but the pain is still intermittently occurring.  Patient currently rates his pain 5 out of 10.  No other complaints.    HPI    Patient Care Team  Primary Care Provider: Provider, No Known    Past Medical History:     No Known Allergies  Past Medical History:   Diagnosis Date    ADHD     Anxiety     Arthritis     Brain concussion     Cervical disc disorder     Headache     Hyperlipidemia     Hypertension     Low back pain     Lumbosacral disc disease     Migraines     Pain aggravated by physical activity     Peripheral neuropathy     Plantar fasciitis     PTSD (post-traumatic stress disorder)      Past Surgical History:   Procedure Laterality Date    APPENDECTOMY      EPIDURAL BLOCK      Neck    NOSE SURGERY      TONSILLECTOMY      TRIGGER POINT INJECTION      Neck and shoulders    VASECTOMY       Family History   Family history unknown: Yes       Home Medications:  Prior to Admission medications    Medication Sig Start Date End Date Taking?  "Authorizing Provider   Atorvastatin Calcium (LIPITOR PO) Take  by mouth.    Familia Park MD   carvedilol (COREG) 6.25 MG tablet     Familia Park MD   clonazePAM (KlonoPIN) 1 MG tablet  8/17/23   Familia Park MD   Diclofenac Sodium (VOLTAREN) 1 % gel gel Apply 4 g topically to the appropriate area as directed 3 (Three) Times a Day As Needed.    Familia Park MD   loperamide (Imodium A-D) 2 MG tablet Take 1 tablet by mouth 4 (Four) Times a Day As Needed for Diarrhea. 11/25/23   Margarette Gonzalez APRN   LOSARTAN POTASSIUM PO Take  by mouth.    Familia Park MD   omeprazole (priLOSEC) 40 MG capsule     Familia Park MD   ondansetron ODT (ZOFRAN-ODT) 8 MG disintegrating tablet Place 1 tablet on the tongue Every 8 (Eight) Hours As Needed for Nausea or Vomiting. 11/25/23   Margarette Gonzalez APRN   propranolol (INDERAL) 40 MG tablet     Familia Park MD   QUEtiapine (SEROquel) 100 MG tablet  9/18/23   Familia Park MD   sildenafil (VIAGRA) 100 MG tablet  11/3/23   Familia Park MD   Testosterone 1.62 % gel  11/3/23   Familia Park MD        Social History:   Social History     Tobacco Use    Smoking status: Never    Smokeless tobacco: Never   Vaping Use    Vaping status: Never Used   Substance Use Topics    Alcohol use: Not Currently    Drug use: Never         Review of Systems:  Review of Systems   Constitutional:  Negative for fever.   Respiratory:  Negative for shortness of breath.    Cardiovascular:  Negative for chest pain.   Gastrointestinal:  Negative for abdominal pain, diarrhea, nausea and vomiting.   Genitourinary:  Positive for flank pain and hematuria. Negative for dysuria.   Musculoskeletal:  Positive for back pain.   All other systems reviewed and are negative.       Physical Exam:  BP (!) 138/102 (BP Location: Right arm, Patient Position: Lying)   Pulse 79   Temp 98.3 °F (36.8 °C) (Oral)   Resp 16   Ht 170.2 cm (67\")   Wt 91.9 " kg (202 lb 9.6 oz)   SpO2 100%   BMI 31.73 kg/m²     Physical Exam  Vitals and nursing note reviewed.   Constitutional:       Appearance: Normal appearance. He is normal weight.   HENT:      Head: Normocephalic and atraumatic.      Nose: Nose normal.   Eyes:      Conjunctiva/sclera: Conjunctivae normal.      Pupils: Pupils are equal, round, and reactive to light.   Cardiovascular:      Rate and Rhythm: Normal rate and regular rhythm.      Heart sounds: Normal heart sounds.   Pulmonary:      Effort: Pulmonary effort is normal.      Breath sounds: Normal breath sounds.   Abdominal:      General: Abdomen is flat. Bowel sounds are normal. There is no distension.      Palpations: Abdomen is soft. There is no mass.      Tenderness: There is no abdominal tenderness. There is left CVA tenderness. There is no right CVA tenderness, guarding or rebound.      Hernia: No hernia is present.   Musculoskeletal:         General: Normal range of motion.      Cervical back: Normal range of motion and neck supple.      Lumbar back: Tenderness and bony tenderness present. No swelling, edema, deformity, signs of trauma, lacerations or spasms. Decreased range of motion. No scoliosis.   Skin:     General: Skin is warm and dry.   Neurological:      General: No focal deficit present.      Mental Status: He is alert and oriented to person, place, and time.   Psychiatric:         Mood and Affect: Mood normal.         Behavior: Behavior normal.         Thought Content: Thought content normal.         Judgment: Judgment normal.                Procedures:  Procedures      Medical Decision Making:    Comorbidities that affect care:    Hypertension, hyperlipidemia    External Notes reviewed:    Previous Clinic Note: Family medicine office visit from 11-      The following orders were placed and all results were independently analyzed by me:  Orders Placed This Encounter   Procedures    CT Abdomen Pelvis With Contrast    Endeavor Draw     Comprehensive Metabolic Panel    Lipase    Urinalysis With Microscopic If Indicated (No Culture) - Urine, Clean Catch    CBC Auto Differential    NPO Diet NPO Type: Strict NPO    Undress & Gown    Insert Peripheral IV    CBC & Differential    Green Top (Gel)    Lavender Top    Gold Top - SST    Light Blue Top       Medications Given in the Emergency Department:  Medications   sodium chloride 0.9 % flush 10 mL (has no administration in time range)   iopamidol (ISOVUE-370) 76 % injection 100 mL (90 mL Intravenous Given 5/14/24 1712)        ED Course:         Labs:    Lab Results (last 24 hours)       Procedure Component Value Units Date/Time    CBC & Differential [[id number]]  (Normal) Collected: 05/14/24 1605    Specimen: Blood Updated: 05/14/24 1613    Narrative:      The following orders were created for panel order CBC & Differential.  Procedure                               Abnormality         Status                     ---------                               -----------         ------                     CBC Auto Differential[[id number]]        Normal              Final result                 Please view results for these tests on the individual orders.    Comprehensive Metabolic Panel [[id number]] Collected: 05/14/24 1605    Specimen: Blood Updated: 05/14/24 1629     Glucose 88 mg/dL      BUN 12 mg/dL      Creatinine 1.18 mg/dL      Sodium 142 mmol/L      Potassium 4.0 mmol/L      Chloride 106 mmol/L      CO2 25.7 mmol/L      Calcium 9.1 mg/dL      Total Protein 6.9 g/dL      Albumin 4.4 g/dL      ALT (SGPT) 40 U/L      AST (SGOT) 26 U/L      Alkaline Phosphatase 83 U/L      Total Bilirubin 0.4 mg/dL      Globulin 2.5 gm/dL      A/G Ratio 1.8 g/dL      BUN/Creatinine Ratio 10.2     Anion Gap 10.3 mmol/L      eGFR 79.5 mL/min/1.73     Narrative:      GFR Normal >60  Chronic Kidney Disease <60  Kidney Failure <15      Lipase [[id number]]  (Normal) Collected: 05/14/24 1605    Specimen: Blood Updated: 05/14/24 1629      Lipase 34 U/L     CBC Auto Differential [[id number]]  (Normal) Collected: 05/14/24 1605    Specimen: Blood Updated: 05/14/24 1613     WBC 5.51 10*3/mm3      RBC 5.06 10*6/mm3      Hemoglobin 15.4 g/dL      Hematocrit 45.0 %      MCV 88.9 fL      MCH 30.4 pg      MCHC 34.2 g/dL      RDW 12.7 %      RDW-SD 41.0 fl      MPV 10.4 fL      Platelets 151 10*3/mm3      Neutrophil % 55.7 %      Lymphocyte % 35.0 %      Monocyte % 8.0 %      Eosinophil % 0.7 %      Basophil % 0.4 %      Immature Grans % 0.2 %      Neutrophils, Absolute 3.07 10*3/mm3      Lymphocytes, Absolute 1.93 10*3/mm3      Monocytes, Absolute 0.44 10*3/mm3      Eosinophils, Absolute 0.04 10*3/mm3      Basophils, Absolute 0.02 10*3/mm3      Immature Grans, Absolute 0.01 10*3/mm3      nRBC 0.0 /100 WBC     Urinalysis With Microscopic If Indicated (No Culture) - Urine, Clean Catch [[id number]]  (Normal) Collected: 05/14/24 1716    Specimen: Urine, Clean Catch Updated: 05/14/24 1728     Color, UA Yellow     Appearance, UA Clear     pH, UA 6.0     Specific Gravity, UA 1.023     Glucose, UA Negative     Ketones, UA Negative     Bilirubin, UA Negative     Blood, UA Negative     Protein, UA Negative     Leuk Esterase, UA Negative     Nitrite, UA Negative     Urobilinogen, UA 1.0 E.U./dL    Narrative:      Urine microscopic not indicated.             Imaging:    CT Abdomen Pelvis With Contrast    Result Date: 5/14/2024  CT ABDOMEN PELVIS W CONTRAST-  Date of Exam: 5/14/2024 5:00 PM  Indication: l flank pain x 1 week.  Comparison: CT abdomen and pelvis 6/16/2019  Technique: Contiguous axial CT images were obtained from the lung bases to the pubic symphysis following uneventful administration of Isovue intravenous contrast. Sagittal and coronal reconstructions were performed.  Automated exposure control and iterative reconstruction methods were used.  FINDINGS: Lung bases demonstrate minimal linear atelectasis at the left lower lobe. No pericardial effusion or  pleural effusion.  Liver and spleen normal in size and contour. Normal gallbladder. Normal adrenal glands. Pancreas without evidence of pancreatitis. Gallbladder present. No pericholecystic inflammation.  Kidneys symmetric in size. Normal renal enhancement. No hydronephrosis or hydroureter. No obstructive uropathy. Urinary bladder thin-walled. Prostate size normal.  Negative for pneumoperitoneum. No bowel obstruction. No fluid collection in the abdomen or pelvis. No findings of appendicitis. The portal vein, splenic vein, and superior mesenteric vein are patent. Abdominal aorta and branch vasculature patent. No aggressive osseous lesion or fracture.      No acute abnormality in the abdomen or pelvis.      Electronically Signed By-Carlos Delgado MD On:5/14/2024 5:35 PM         Differential Diagnosis and Discussion:    Flank Pain: Differential diagnosis includes but is not limited to kidney stones, pyelonephritis, musculoskeletal disorders, renal infarction, urinary tract infection, hydronephrosis, radiculopathy, aortic aneurysm, renal cell carcinoma.    All labs were reviewed and interpreted by me.  CT scan radiology impression was interpreted by me.    MDM  Number of Diagnoses or Management Options  Left flank pain  Diagnosis management comments: Patient presented to the emergency department today for evaluation of left flank pain.  CBC, CMP, lipase unremarkable.  Urinalysis negative for any infection or hematuria.  CT abdomen pelvis had no acute findings.  I will have patient continue over-the-counter ibuprofen for pain control and patient was given return to the emergency department guidelines.       Amount and/or Complexity of Data Reviewed  Clinical lab tests: reviewed and ordered  Tests in the radiology section of CPT®: reviewed and ordered    Risk of Complications, Morbidity, and/or Mortality  Presenting problems: moderate  Diagnostic procedures: moderate  Management options: low    Patient Progress  Patient  progress: stable       Patient Care Considerations:    ANTIBIOTICS: I considered prescribing antibiotics as an outpatient however no bacterial focus of infection was found.      Consultants/Shared Management Plan:    None    Social Determinants of Health:    Patient is independent, reliable, and has access to care.       Disposition and Care Coordination:    Discharged: The patient is suitable and stable for discharge with no need for consideration of admission.    I have explained the patient´s condition, diagnoses and treatment plan based on the information available to me at this time. I have answered questions and addressed any concerns. The patient has a good  understanding of the patient´s diagnosis, condition, and treatment plan as can be expected at this point. The vital signs have been stable. The patient´s condition is stable and appropriate for discharge from the emergency department.      The patient will pursue further outpatient evaluation with the primary care physician or other designated or consulting physician as outlined in the discharge instructions. They are agreeable to this plan of care and follow-up instructions have been explained in detail. The patient has received these instructions in written format and has expressed an understanding of the discharge instructions. The patient is aware that any significant change in condition or worsening of symptoms should prompt an immediate return to this or the closest emergency department or call to 911.  I have explained discharge medications and the need for follow up with the patient/caretakers. This was also printed in the discharge instructions. Patient was discharged with the following medications and follow up:      Medication List      No changes were made to your prescriptions during this visit.      Provider, No Known  [facility]  [place] [id number]             Final diagnoses:   Left flank pain        ED Disposition       ED  Disposition   Discharge    Condition   Stable    Comment   --               This medical record created using voice recognition software.             Jay Cao PA-C  05/14/24 [id number]

## 2025-03-11 ENCOUNTER — TRANSCRIBE ORDERS (OUTPATIENT)
Dept: ADMINISTRATIVE | Facility: HOSPITAL | Age: 40
End: 2025-03-11
Payer: OTHER GOVERNMENT

## 2025-03-17 ENCOUNTER — TRANSCRIBE ORDERS (OUTPATIENT)
Dept: ADMINISTRATIVE | Facility: HOSPITAL | Age: 40
End: 2025-03-17
Payer: OTHER GOVERNMENT

## 2025-03-17 DIAGNOSIS — G90.09 IDIOPATHIC PERIPHERAL AUTONOMIC NEUROPATHY: Primary | ICD-10-CM

## 2025-03-26 ENCOUNTER — HOSPITAL ENCOUNTER (OUTPATIENT)
Facility: HOSPITAL | Age: 40
Discharge: HOME | End: 2025-03-26
Admitting: FAMILY MEDICINE
Payer: OTHER GOVERNMENT

## 2025-03-26 DIAGNOSIS — G90.09 IDIOPATHIC PERIPHERAL AUTONOMIC NEUROPATHY: ICD-10-CM

## 2025-03-26 PROCEDURE — 95911 NRV CNDJ TEST 9-10 STUDIES: CPT

## 2025-03-26 PROCEDURE — 95886 MUSC TEST DONE W/N TEST COMP: CPT

## 2025-07-02 ENCOUNTER — OFFICE VISIT (OUTPATIENT)
Dept: SLEEP MEDICINE | Facility: HOSPITAL | Age: 40
End: 2025-07-02
Payer: OTHER GOVERNMENT

## 2025-07-02 VITALS
OXYGEN SATURATION: 99 % | BODY MASS INDEX: 35.2 KG/M2 | DIASTOLIC BLOOD PRESSURE: 93 MMHG | HEIGHT: 67 IN | HEART RATE: 82 BPM | SYSTOLIC BLOOD PRESSURE: 130 MMHG | WEIGHT: 224.3 LBS

## 2025-07-02 DIAGNOSIS — F51.5 NIGHTMARES ASSOCIATED WITH CHRONIC POST-TRAUMATIC STRESS DISORDER: ICD-10-CM

## 2025-07-02 DIAGNOSIS — G47.33 OSA (OBSTRUCTIVE SLEEP APNEA): Primary | ICD-10-CM

## 2025-07-02 DIAGNOSIS — G47.19 EXCESSIVE DAYTIME SLEEPINESS: ICD-10-CM

## 2025-07-02 DIAGNOSIS — G47.8 NON-RESTORATIVE SLEEP: ICD-10-CM

## 2025-07-02 DIAGNOSIS — F51.04 CHRONIC INSOMNIA: ICD-10-CM

## 2025-07-02 DIAGNOSIS — F43.12 NIGHTMARES ASSOCIATED WITH CHRONIC POST-TRAUMATIC STRESS DISORDER: ICD-10-CM

## 2025-07-02 PROCEDURE — 99204 OFFICE O/P NEW MOD 45 MIN: CPT | Performed by: INTERNAL MEDICINE

## 2025-07-02 PROCEDURE — G0463 HOSPITAL OUTPT CLINIC VISIT: HCPCS

## 2025-07-02 RX ORDER — ESCITALOPRAM OXALATE 20 MG/1
TABLET ORAL EVERY 24 HOURS
COMMUNITY

## 2025-07-02 RX ORDER — ARIPIPRAZOLE 2 MG/1
TABLET ORAL
COMMUNITY
Start: 2025-05-06

## 2025-07-02 RX ORDER — CHLORPHENIRAMINE MALEATE 4 MG
TABLET ORAL
COMMUNITY
Start: 2025-04-02

## 2025-07-02 RX ORDER — GABAPENTIN 100 MG/1
CAPSULE ORAL
COMMUNITY
Start: 2025-07-01

## 2025-07-02 RX ORDER — UBROGEPANT 100 MG/1
TABLET ORAL
COMMUNITY
Start: 2025-06-09

## 2025-07-02 RX ORDER — PHENTERMINE HYDROCHLORIDE 37.5 MG/1
CAPSULE ORAL
COMMUNITY
Start: 2025-07-02

## 2025-07-02 NOTE — PROGRESS NOTES
[facility]  Sleep Medicine   [address] [place] [address]  [place]   [place] [address]  Phone: [phone]  Fax: [phone]      Jordy Mg  [id number]   1985  40 y.o.  male      PCP: [facility]. Kyaw Abebe MD    Type of service: Initial New Patient Office Visit  Date of service: 7/2/2025    Chief Complaint   Patient presents with    Sleep Apnea    Insomnia    Obesity    Non-restorative Sleep    Nightmares       History of present illness;  Jordy Mg 40 y.o.  is a new patient for me and was seen today for agement of obstructive sleep apnea.  Patient reports that he had a sleep study in 2019 at the [facility] and was given a CPAP.  Unfortunately he has not been using the CPAP because he cannot keep anything on his face.  He says he uses a nasal cushion with a chinstrap.  It looks like he has a ResMed 10 and I have given him a smart card so that he can use it for about 4 weeks and then see me back so that I can take a look at the download and see if the pressure needs to be adjusted.  However meantime we are trying to get a sleep study to review.    Other medical problem is chronic insomnia.  He is retired from the  and he has chronic insomnia.  Does take about an hour or 2 to fall asleep and then he wakes up he feels he has not slept unfortunately he is also taking a nap which is lasting about 2 to 3 hours in the daytime.  He lives alone but he has custody of his 2 children spends 1 week with him and then another week with the children's mom.    Patient also reports PTSD and nightmares.  I have reviewed his medications he is on propranolol 40 mg daily which he is taking at nighttime.  I have told him to move it to morning to see if that will lessen his dreams.  Otherwise he may need prazosin to control his nightmares.  He is also under the care of psychiatrist.    Patient gives the following sleep history.  Sleep schedule:  Bedtime: 10 PM  Wake  "time: 7 AM  Normally takes about 2 hours to fall asleep  Average hours of sleep 4-5  Number of naps per day a long nap lasting about 2 to 3 hours      Past medical history: (Relevant to sleep medicine)  Sleep apnea  PTSD  Chronic insomnia  Nightmares  Tremors  Migraine    Medications are reviewed by me and documented in the encounter  Allergies reviewed and documented in encounter    Social history:  Do you drive a commercial vehicle:  No   Shift work:  No   Tobacco use:  No   Alcohol use:  0 per week  Caffeinated drinks: 1    FAMILY HISTORY (Your mother, father, brothers and sisters) (relevant to sleep medicine)  No history of sleep apnea  Thyroid disorder    REVIEW OF SYSTEMS.  Full review of systems available on the intake form which is scanned in the media tab.  The relevant positive are noted below  Daytime excessive sleepiness with Beattie Sleepiness Scale :Total score: 14   Snoring  Nightmares  Nonrestorative sleep      Physical exam:  Vitals:    07/02/25 0900   BP: 130/93   Pulse: 82   SpO2: 99%   Weight: 102 kg (224 lb 4.8 oz)   Height: 170.2 cm (67\")    Body mass index is 35.13 kg/m². Neck Circumference: 16 inches  Nose: no nasal septal defects or deviation and the nasal passages are clear, no nasal polyps,  Throat: tonsils are not enlarged, tongue normal, oral airway Mallampati class 3  NECK:Neck Circumference: 16 inches, trachea is in the midline, thyroid not enlarged  RESPIRATORY SYSTEM: Breath sounds are equal on both sides, there are no wheezes   CARDIOVASULAR SYSTEM: Heart sounds are regular rhythm and jenna rate, no edema  EXTREMITES: No cyanosis, clubbing  NEUROLOGICAL SYSTEM: Oriented x 3, no gross motor defects, gait normal          Assessment and plan:  Obstructive sleep apnea ( G 47.33).   Patient has sleep apnea I am trying to get the sleep study so that I can review the report.  Meantime I have given him as smart card so that he can use the CPAP and see me back in about 4 weeks so that I can " take a look at the download and see whether he is tolerating CPAP or not if he is not tolerating the CPAP then he may benefit from the BiPAP.   Without proper control of sleep apnea and good compliance there is a increased risk for hypertension, diabetes mellitus and nonrestorative sleep with hypersomnia which can increase risk for motor vehicle accidents.  Untreated sleep apnea is also a risk factor for development of atrial fibrillation, pulmonary hypertension and stroke..  Snoring (R06.83) snoring is the sound created by turbulent airflow vibrating upper airway soft tissue.  I have also discussed factors affecting snoring including sleep deprivation, sleeping on the back and alcohol ingestion. To minimize snoring, patient is advised to have adequate sleep, sleep on the side and avoid alcohol and sedative medications before bedtime  Daytime excessive sleepiness .  It was assessed with Patterson Sleepiness Scale of Total score: 14.  There are many causes for daytime excessive sleepiness including sleep depression, shiftwork syndrome, depression and other medical disorders including heart, kidney and liver failure.  The most serious cause of excessive sleepiness is due to neurological conditions like narcolepsy/cataplexy.  But the most common cause of excessive sleepiness is due to sleep apnea with frequent awakenings during sleep time.  I have discussed safety of driving and to remain vigilant while driving.  Obesity 2, with BMI Body mass index is 35.13 kg/m².. I have discussed the relationship between weight and sleep apnea.There is direct correlation between weight and severity of sleep apnea.  Weight reduction is encouraged, as it is going to reduce the severity of sleep apnea. I have also discussed with the patient diet and exercise to achieve ideal body weight   Nightmares with the PTSD, he is on beta-blockers propranolol which he is taking at nighttime I have asked him to take it in the morning  Chronic  insomnia.  Discussed CBT-I (cognitive behavoral therapy) and also stimulus control given a handout.  He is encouraged not to take any naps and consolidated sleep and also maintain a regular sleep schedule.  I also discussed the stimulus control. Stimulus control counseling is AASM Standard Recommendation regarding insomnia:         Counseling initiated to help establish and strength positive association between falling asleep and bed and bedtime routines. The following instructions were emphasized:  Maintain regular sleep-wake schedule  Avoiding napping   Use bed only for nocturnal sleep and intimacy (no reading, eating, watching TV and looking at electronic screen)  Attempt to fall asleep ONLY when sleepy   If unable to fall asleep within 15-20 minutes, leave the bed and pursue a relaxing boring activity in a dark or dimly lit area, then return to bed only when sleepy (This STEP MUST BE REPEATED as often as necessary with emphasis to only attempt to fall asleep in bed when sleepy)    Return in about 4 weeks (around 7/30/2025) for Recheck with smart card down load..  Patient's questions were answered.      7/2/2025  Gabriel Silverio MD  Sleep Medicine  Medical Director  [facility]: [place] [facility] [place] [facility]